# Patient Record
Sex: FEMALE | Employment: UNEMPLOYED | URBAN - METROPOLITAN AREA
[De-identification: names, ages, dates, MRNs, and addresses within clinical notes are randomized per-mention and may not be internally consistent; named-entity substitution may affect disease eponyms.]

---

## 2022-02-14 ENCOUNTER — HOSPITAL ENCOUNTER (EMERGENCY)
Facility: CLINIC | Age: 67
Discharge: HOME | End: 2022-02-15
Attending: EMERGENCY MEDICINE | Admitting: EMERGENCY MEDICINE
Payer: COMMERCIAL

## 2022-02-14 DIAGNOSIS — L30.9 DERMATITIS: ICD-10-CM

## 2022-02-14 PROCEDURE — 99283 EMERGENCY DEPT VISIT LOW MDM: CPT

## 2022-02-14 RX ORDER — VALACYCLOVIR HYDROCHLORIDE 1 G/1
1000 TABLET, FILM COATED ORAL 3 TIMES DAILY
Qty: 21 TABLET | Refills: 0 | Status: SHIPPED | OUTPATIENT
Start: 2022-02-14 | End: 2022-02-21

## 2022-02-14 ASSESSMENT — MIFFLIN-ST. JEOR: SCORE: 1294.32

## 2022-02-15 VITALS
TEMPERATURE: 98.3 F | BODY MASS INDEX: 30.48 KG/M2 | DIASTOLIC BLOOD PRESSURE: 69 MMHG | RESPIRATION RATE: 18 BRPM | HEIGHT: 63 IN | OXYGEN SATURATION: 98 % | WEIGHT: 172 LBS | HEART RATE: 59 BPM | SYSTOLIC BLOOD PRESSURE: 162 MMHG

## 2022-02-15 NOTE — ED PROVIDER NOTES
"  History     Chief Complaint:    Rash       HPI   Deepak Hernandez is a 67 year old female who presents with rash to her neck and left shoulder region.  Symptoms started 2 days ago.  She has history of shingles many years ago but is not sure if this feels similar.  It is uncomfortable but not painful.  It is not very itchy.  She has been applying lotions, which are not new.  No other symptoms including fever.    Allergies:  No Known Allergies     Medications:    valACYclovir (VALTREX) 1000 mg tablet        Past Medical History:    No past medical history on file.    There are no problems to display for this patient.      Past Surgical History:    No past surgical history on file.     Family History:    family history is not on file.    Social History:     Presents with son  PCP: No primary care provider on file.     Review of Systems  A 10 point ROS was obtained and negative except as noted here and in HPI      Physical Exam     Patient Vitals for the past 24 hrs:   BP Temp Temp src Pulse Resp SpO2 Height Weight   02/15/22 0030 (!) 162/69 -- -- 59 -- -- -- --   02/14/22 2329 (!) 184/72 98.3  F (36.8  C) Temporal 85 18 98 % 1.6 m (5' 3\") 78 kg (172 lb)        Physical Exam  VS: Reviewed per above  HENT: normal speech, no nuchal rigidity  EYES: sclera anicteric  CV: Rate as noted, regular rhythm.   RESP: Effort normal. Breath sounds are normal bilaterally.  NEURO: Alert, moving all extremities  MSK: No deformity of the extremities  SKIN: Warm and dry, erythematous and dry appearing skin to the posterior neck, worse on the left.  No appreciable vesicular lesions.  Area is not excessively tender.    Emergency Department Course     Emergency Department Course:  Past medical records, nursing notes, and vitals reviewed.  I performed an exam of the patient and obtained history, as documented above.   Findings and plan explained to the Patient and son. Patient was discharged.    Impression & Plan      Medical Decision " Making:  Patient presents to the ER for evaluation of rash to the posterior neck, worse on the left, which is slightly uncomfortable.  Symptoms started a few days ago.  Vital signs are reassuring.  At this time rash is not consistent with obvious shingles however she has history of shingles.  Her rash is most consistent with nonspecific dermatitis.  I recommended discontinuing other moisturizing creams aside from Aquaphor ointment.  I also provided prescription for Valtrex in case she notices vesicular lesions, which could signify shingles.  Encouraged ongoing primary care follow.  Return precaution discussed prior to discharge.    Diagnosis:    ICD-10-CM    1. Dermatitis  L30.9         Discharge Medications:  Discharge Medication List as of 2/15/2022 12:21 AM      START taking these medications    Details   valACYclovir (VALTREX) 1000 mg tablet Take 1 tablet (1,000 mg) by mouth 3 times daily for 7 days, Disp-21 tablet, R-0, Local Print              2/14/2022   Bartolo Gates MD Lindenbaum, Elan, MD  02/15/22 0136

## 2022-02-15 NOTE — ED TRIAGE NOTES
Here for concern of rash to left side of neck radiating down back of left shoulder started 2 days ago. History of shingles about 30 years. ABCs intact.

## 2022-02-15 NOTE — DISCHARGE INSTRUCTIONS
"At this time it seems that you have irritated, dry skin.  With your history of shingles and location of the rash, I prescribed a medication that would treat shingles.  Please do not start taking this unless you notice blistering in the affected area.  Follow-up with your doctor next week.  Please stop using your lotions aside from \"Aquaphor\" ointment to help moisturize the skin.  "